# Patient Record
Sex: MALE | Race: BLACK OR AFRICAN AMERICAN | NOT HISPANIC OR LATINO
[De-identification: names, ages, dates, MRNs, and addresses within clinical notes are randomized per-mention and may not be internally consistent; named-entity substitution may affect disease eponyms.]

---

## 2017-01-23 ENCOUNTER — APPOINTMENT (OUTPATIENT)
Dept: OTOLARYNGOLOGY | Facility: CLINIC | Age: 57
End: 2017-01-23

## 2017-01-23 VITALS
SYSTOLIC BLOOD PRESSURE: 133 MMHG | DIASTOLIC BLOOD PRESSURE: 78 MMHG | BODY MASS INDEX: 28.79 KG/M2 | WEIGHT: 190 LBS | HEART RATE: 75 BPM | HEIGHT: 68 IN

## 2017-09-18 ENCOUNTER — OUTPATIENT (OUTPATIENT)
Dept: OUTPATIENT SERVICES | Facility: HOSPITAL | Age: 57
LOS: 1 days | End: 2017-09-18
Payer: COMMERCIAL

## 2017-09-18 VITALS
OXYGEN SATURATION: 100 % | HEIGHT: 70 IN | HEART RATE: 54 BPM | TEMPERATURE: 98 F | RESPIRATION RATE: 18 BRPM | SYSTOLIC BLOOD PRESSURE: 144 MMHG | WEIGHT: 195.99 LBS | DIASTOLIC BLOOD PRESSURE: 90 MMHG

## 2017-09-18 DIAGNOSIS — K11.20 SIALOADENITIS, UNSPECIFIED: ICD-10-CM

## 2017-09-18 DIAGNOSIS — K11.22 ACUTE RECURRENT SIALOADENITIS: ICD-10-CM

## 2017-09-18 DIAGNOSIS — Z01.818 ENCOUNTER FOR OTHER PREPROCEDURAL EXAMINATION: ICD-10-CM

## 2017-09-18 DIAGNOSIS — I63.9 CEREBRAL INFARCTION, UNSPECIFIED: ICD-10-CM

## 2017-09-18 DIAGNOSIS — E78.5 HYPERLIPIDEMIA, UNSPECIFIED: ICD-10-CM

## 2017-09-18 LAB
HCT VFR BLD CALC: 43.6 % — SIGNIFICANT CHANGE UP (ref 39–50)
HGB BLD-MCNC: 14.3 G/DL — SIGNIFICANT CHANGE UP (ref 13–17)
MCHC RBC-ENTMCNC: 23.6 PG — LOW (ref 27–34)
MCHC RBC-ENTMCNC: 32.8 GM/DL — SIGNIFICANT CHANGE UP (ref 32–36)
MCV RBC AUTO: 72.1 FL — LOW (ref 80–100)
PLATELET # BLD AUTO: 231 K/UL — SIGNIFICANT CHANGE UP (ref 150–400)
RBC # BLD: 6.05 M/UL — HIGH (ref 4.2–5.8)
RBC # FLD: 15.5 % — HIGH (ref 10.3–14.5)
WBC # BLD: 6.65 K/UL — SIGNIFICANT CHANGE UP (ref 3.8–10.5)
WBC # FLD AUTO: 6.65 K/UL — SIGNIFICANT CHANGE UP (ref 3.8–10.5)

## 2017-09-18 PROCEDURE — 80048 BASIC METABOLIC PNL TOTAL CA: CPT

## 2017-09-18 PROCEDURE — G0463: CPT

## 2017-09-18 PROCEDURE — 85027 COMPLETE CBC AUTOMATED: CPT

## 2017-09-18 RX ORDER — SODIUM CHLORIDE 9 MG/ML
3 INJECTION INTRAMUSCULAR; INTRAVENOUS; SUBCUTANEOUS EVERY 8 HOURS
Qty: 0 | Refills: 0 | Status: DISCONTINUED | OUTPATIENT
Start: 2017-09-22 | End: 2017-10-07

## 2017-09-18 RX ORDER — LIDOCAINE HCL 20 MG/ML
0.2 VIAL (ML) INJECTION ONCE
Qty: 0 | Refills: 0 | Status: DISCONTINUED | OUTPATIENT
Start: 2017-09-22 | End: 2017-10-07

## 2017-09-18 NOTE — H&P PST ADULT - PROBLEM SELECTOR PLAN 3
pt was on plavix for history of CVA, pt stopped plavix  9/14/2017,  per PCP and surgeon instructed,  instructed pt to see PCP for medical clearance before surgery

## 2017-09-18 NOTE — H&P PST ADULT - HISTORY OF PRESENT ILLNESS
This is a  58 y/o male c/o chronic sialoadenitis  x 7 years associated with left submandibular  swelling, he presents today for surgery

## 2017-09-18 NOTE — H&P PST ADULT - PROBLEM SELECTOR PLAN 1
interventional sialoendoscopy  bilateral submandibular glands  possible combined approach with stone removal  possible sialodocoplasty  possible kenalogue injection  ( pt was booked for bilateral mandibular glands surgery, pt stated ONLY LEFT submandibular surgery, left message for Ivonne surgical booking in surgeon office , instructed pt to call surgeon to verify side of surgery)

## 2017-09-18 NOTE — H&P PST ADULT - PMH
Heart murmur  echo  8/2017 no SBE  Hyperlipidemia    Stroke  2014   with speech impairment Heart murmur  echo  8/2017 no SBE  Hyperlipidemia    Stroke  2007  with speech impairment, on plavix

## 2017-09-19 LAB
ANION GAP SERPL CALC-SCNC: 14 MMOL/L — SIGNIFICANT CHANGE UP (ref 5–17)
BUN SERPL-MCNC: 21 MG/DL — SIGNIFICANT CHANGE UP (ref 7–23)
CALCIUM SERPL-MCNC: 9.6 MG/DL — SIGNIFICANT CHANGE UP (ref 8.4–10.5)
CHLORIDE SERPL-SCNC: 103 MMOL/L — SIGNIFICANT CHANGE UP (ref 96–108)
CO2 SERPL-SCNC: 24 MMOL/L — SIGNIFICANT CHANGE UP (ref 22–31)
CREAT SERPL-MCNC: 1.07 MG/DL — SIGNIFICANT CHANGE UP (ref 0.5–1.3)
GLUCOSE SERPL-MCNC: 76 MG/DL — SIGNIFICANT CHANGE UP (ref 70–99)
POTASSIUM SERPL-MCNC: 4.8 MMOL/L — SIGNIFICANT CHANGE UP (ref 3.5–5.3)
POTASSIUM SERPL-SCNC: 4.8 MMOL/L — SIGNIFICANT CHANGE UP (ref 3.5–5.3)
SODIUM SERPL-SCNC: 141 MMOL/L — SIGNIFICANT CHANGE UP (ref 135–145)

## 2017-09-22 ENCOUNTER — APPOINTMENT (OUTPATIENT)
Dept: OTOLARYNGOLOGY | Facility: HOSPITAL | Age: 57
End: 2017-09-22

## 2017-09-22 ENCOUNTER — OUTPATIENT (OUTPATIENT)
Dept: OUTPATIENT SERVICES | Facility: HOSPITAL | Age: 57
LOS: 1 days | End: 2017-09-22
Payer: COMMERCIAL

## 2017-09-22 ENCOUNTER — TRANSCRIPTION ENCOUNTER (OUTPATIENT)
Age: 57
End: 2017-09-22

## 2017-09-22 VITALS
HEIGHT: 70 IN | WEIGHT: 195.99 LBS | HEART RATE: 70 BPM | DIASTOLIC BLOOD PRESSURE: 90 MMHG | TEMPERATURE: 99 F | RESPIRATION RATE: 18 BRPM | OXYGEN SATURATION: 100 % | SYSTOLIC BLOOD PRESSURE: 144 MMHG

## 2017-09-22 VITALS
SYSTOLIC BLOOD PRESSURE: 122 MMHG | DIASTOLIC BLOOD PRESSURE: 75 MMHG | RESPIRATION RATE: 16 BRPM | HEART RATE: 68 BPM | OXYGEN SATURATION: 98 % | TEMPERATURE: 98 F

## 2017-09-22 DIAGNOSIS — K11.22 ACUTE RECURRENT SIALOADENITIS: ICD-10-CM

## 2017-09-22 DIAGNOSIS — K11.20 SIALOADENITIS, UNSPECIFIED: ICD-10-CM

## 2017-09-22 PROCEDURE — C1726: CPT

## 2017-09-22 PROCEDURE — 42699 UNLISTED PX SALIVRY GLND/DUX: CPT

## 2017-09-22 PROCEDURE — 42699B: CUSTOM

## 2017-09-22 RX ORDER — ONDANSETRON 8 MG/1
4 TABLET, FILM COATED ORAL ONCE
Qty: 0 | Refills: 0 | Status: DISCONTINUED | OUTPATIENT
Start: 2017-09-22 | End: 2017-10-07

## 2017-09-22 RX ORDER — SODIUM CHLORIDE 9 MG/ML
1000 INJECTION, SOLUTION INTRAVENOUS
Qty: 0 | Refills: 0 | Status: DISCONTINUED | OUTPATIENT
Start: 2017-09-22 | End: 2017-10-07

## 2017-09-22 RX ORDER — ACETAMINOPHEN 500 MG
1000 TABLET ORAL ONCE
Qty: 0 | Refills: 0 | Status: DISCONTINUED | OUTPATIENT
Start: 2017-09-22 | End: 2017-10-07

## 2017-09-22 RX ORDER — OXYCODONE HYDROCHLORIDE 5 MG/1
5 TABLET ORAL ONCE
Qty: 0 | Refills: 0 | Status: DISCONTINUED | OUTPATIENT
Start: 2017-09-22 | End: 2017-09-22

## 2017-09-22 RX ORDER — OXYCODONE HYDROCHLORIDE 5 MG/1
10 TABLET ORAL ONCE
Qty: 0 | Refills: 0 | Status: DISCONTINUED | OUTPATIENT
Start: 2017-09-22 | End: 2017-09-22

## 2017-09-22 RX ORDER — SODIUM CHLORIDE 9 MG/ML
1000 INJECTION INTRAMUSCULAR; INTRAVENOUS; SUBCUTANEOUS
Qty: 0 | Refills: 0 | Status: DISCONTINUED | OUTPATIENT
Start: 2017-09-22 | End: 2017-10-07

## 2017-09-22 NOTE — ASU PATIENT PROFILE, ADULT - PMH
Heart murmur  echo  8/2017 no SBE  Hyperlipidemia    Stroke  2007  with speech impairment, on plavix

## 2017-09-22 NOTE — ASU DISCHARGE PLAN (ADULT/PEDIATRIC). - NOTIFY
Fever greater than 101/Inability to Tolerate Liquids or Foods/Pain not relieved by Medications/Swelling that continues/Persistent Nausea and Vomiting/Unable to Urinate/Bleeding that does not stop

## 2017-09-22 NOTE — PRE-ANESTHESIA EVALUATION ADULT - NSANTHOSAYNRD_GEN_A_CORE
No. JOSE DAVID screening performed.  STOP BANG Legend: 0-2 = LOW Risk; 3-4 = INTERMEDIATE Risk; 5-8 = HIGH Risk
No. JOSE DAVID screening performed.  STOP BANG Legend: 0-2 = LOW Risk; 3-4 = INTERMEDIATE Risk; 5-8 = HIGH Risk

## 2017-09-22 NOTE — PRE-ANESTHESIA EVALUATION ADULT - NSANTHPMHFT_GEN_ALL_CORE
Slight left side of the body slightly weaker than the right, due to CVS 10 years ago, speech a little impaired

## 2017-09-22 NOTE — PRE-ANESTHESIA EVALUATION ADULT - MALLAMPATI CLASS
Class II - visualization of the soft palate, fauces, and uvula
Class II - visualization of the soft palate, fauces, and uvula

## 2017-09-22 NOTE — ASU DISCHARGE PLAN (ADULT/PEDIATRIC). - SPECIAL INSTRUCTIONS
keep saliva flowing to keep duct open and reduce risk of narrowing:  Massage gland every hour   ice for first 24 -48 hours, then can use warm compress  hydrate well  pain medication - Tylenol or Motrin as needed  expect swelling and discomfort  follow up 1 week in the office

## 2017-09-28 ENCOUNTER — APPOINTMENT (OUTPATIENT)
Dept: OTOLARYNGOLOGY | Facility: CLINIC | Age: 57
End: 2017-09-28
Payer: COMMERCIAL

## 2017-09-28 VITALS
HEART RATE: 62 BPM | HEIGHT: 70 IN | SYSTOLIC BLOOD PRESSURE: 123 MMHG | BODY MASS INDEX: 28.06 KG/M2 | WEIGHT: 196 LBS | DIASTOLIC BLOOD PRESSURE: 69 MMHG

## 2017-09-28 PROCEDURE — 99213 OFFICE O/P EST LOW 20 MIN: CPT

## 2019-06-11 PROBLEM — E78.5 HYPERLIPIDEMIA, UNSPECIFIED: Chronic | Status: ACTIVE | Noted: 2017-09-18

## 2019-06-11 PROBLEM — R01.1 CARDIAC MURMUR, UNSPECIFIED: Chronic | Status: ACTIVE | Noted: 2017-09-18

## 2019-07-02 ENCOUNTER — APPOINTMENT (OUTPATIENT)
Dept: OTOLARYNGOLOGY | Facility: CLINIC | Age: 59
End: 2019-07-02
Payer: COMMERCIAL

## 2019-07-02 VITALS
BODY MASS INDEX: 26.48 KG/M2 | HEART RATE: 58 BPM | DIASTOLIC BLOOD PRESSURE: 79 MMHG | WEIGHT: 185 LBS | HEIGHT: 70 IN | SYSTOLIC BLOOD PRESSURE: 125 MMHG

## 2019-07-02 PROCEDURE — 31575 DIAGNOSTIC LARYNGOSCOPY: CPT

## 2019-07-02 PROCEDURE — 99213 OFFICE O/P EST LOW 20 MIN: CPT | Mod: 25

## 2019-07-02 NOTE — CONSULT LETTER
[Dear  ___] : Dear  [unfilled], [Consult Closing:] : Thank you very much for allowing me to participate in the care of this patient.  If you have any questions, please do not hesitate to contact me. [Consult Letter:] : I had the pleasure of evaluating your patient, [unfilled]. [Please see my note below.] : Please see my note below. [Sincerely,] : Sincerely, [Michael Gaitan MD] : Michael Gaitan MD [Otolaryngology] : Otolaryngology [[facility]] : [facility] [[facility]] : [facility] [FreeTextEntry1] : Dear Dr. FABIENNE HUBER \par I had the pleasure of evaluating your patient RHYS CRUZ  thank you for allowing us to participate in their care. please see full note detailing our visit below.\par If you have any questions, please do not hesitate to call me and I would be happy to discuss further. \par \par Michael Gaitan M.D.\par Attending Physician,  \par Department of Otolaryngology - Head and Neck Surgery\par [facility] \par Office: [phone]\par Fax: [phone]\par

## 2019-07-02 NOTE — PHYSICAL EXAM
[Midline] : trachea located in midline position [Normal] : no masses and lesions seen, face is symmetric [de-identified] : Enlarged SMG b/l, good flow b/l

## 2019-07-02 NOTE — ASSESSMENT
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 no stone seen on sialendoscopy, however b/l severe stenosis dilated with good result, now with some swelling, pt concerned for mass - will get CT to deliniate anatomy, if no mass seen will do office dilation on next visit. no lesions on scope\par \par \par \par I personally saw and examined RHYS CRUZ in detail. I spoke to TARUN Mathew regarding the assessment and plan of care.  I preformed the procedures and I reviewed the above assessment and plan of care, and agree. I have made changes in changes in the body of the note where appropriate.\par \par \par

## 2019-07-02 NOTE — PROCEDURE
[de-identified] : Procedure performed: laryngeal Endoscopy- Diagnostic\par Pre-op/post op indication: dysphonia\par Verbal and/or written consent obtained from patient, Patient was unable to cooperate with mirror\par Scope #: 9, flexible fiber optic telescope used \par Scope was introduced through the nose passed on the floor of the nose to the nasopharynx and then followed down the soft palate to the lower pharynx. The tongue Base, Larynx, Hypopharynx were examined. Base of tongue was symmetric, vallecular was clear, epiglottis was not deformed, subglottis/ pyriform and posterior pharyngeal walls were clear. No erythema, edema, pooling of secretions, masses or lesions. Airway patent, no foreign body visualized. No glottic/supraglottic edema. VF clear arytenoids, vestibular folds, ventricles, pyriform sinuses, and aryepiglottic folds appear normal bilaterally. Vocal cords mobile with good contact b/l.\par \par

## 2019-07-02 NOTE — HISTORY OF PRESENT ILLNESS
[de-identified] : 57 y/o male s/p sialoendoscopy with bilateral dilations and wash outs of stenotic SMG's 9/22/17.\par He's been doing well since procedure but feels SMG have started to get neck swelling within the past several months- L>R.\par No issues with saliva production.\par No pain, dry mouth, foul taste in mouth or difficulty swallowing.

## 2019-07-10 ENCOUNTER — FORM ENCOUNTER (OUTPATIENT)
Age: 59
End: 2019-07-10

## 2019-07-11 ENCOUNTER — APPOINTMENT (OUTPATIENT)
Dept: ULTRASOUND IMAGING | Facility: CLINIC | Age: 59
End: 2019-07-11
Payer: COMMERCIAL

## 2019-07-11 ENCOUNTER — OUTPATIENT (OUTPATIENT)
Dept: OUTPATIENT SERVICES | Facility: HOSPITAL | Age: 59
LOS: 1 days | End: 2019-07-11
Payer: COMMERCIAL

## 2019-07-11 DIAGNOSIS — R22.1 LOCALIZED SWELLING, MASS AND LUMP, NECK: ICD-10-CM

## 2019-07-11 DIAGNOSIS — R22.0 LOCALIZED SWELLING, MASS AND LUMP, HEAD: ICD-10-CM

## 2019-07-11 PROCEDURE — 76536 US EXAM OF HEAD AND NECK: CPT

## 2019-07-11 PROCEDURE — 76536 US EXAM OF HEAD AND NECK: CPT | Mod: 26

## 2019-08-06 ENCOUNTER — APPOINTMENT (OUTPATIENT)
Dept: OTOLARYNGOLOGY | Facility: CLINIC | Age: 59
End: 2019-08-06
Payer: COMMERCIAL

## 2019-08-06 VITALS
SYSTOLIC BLOOD PRESSURE: 124 MMHG | HEART RATE: 60 BPM | DIASTOLIC BLOOD PRESSURE: 70 MMHG | WEIGHT: 185 LBS | BODY MASS INDEX: 26.48 KG/M2 | HEIGHT: 70 IN

## 2019-08-06 PROCEDURE — 42660 DILATION OF SALIVARY DUCT: CPT

## 2019-08-06 PROCEDURE — 99213 OFFICE O/P EST LOW 20 MIN: CPT | Mod: 25

## 2019-08-06 NOTE — ASSESSMENT
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 no stone seen on sialendoscopy, however b/l severe stenosis dilated with good result, now with some swelling, pt concerned for mass - no mass seen, cysts smaller, dilated today. at this point duct is as wide as can be reasonably expected. do not think that another sialoendoscopy would be beneficial. will see if resolves issue. if no pain, and just cosmetic deformity can consider removal for that but would significant affect salivation and risks of procedure, would prefer to monitor and not take risk of surgery \par \par \par \par I personally saw and examined RHYS CRUZ in detail. I spoke to TARUN Mathew regarding the assessment and plan of care.  I preformed the procedures and I reviewed the above assessment and plan of care, and agree. I have made changes in changes in the body of the note where appropriate.\par \par \par

## 2019-08-06 NOTE — PROCEDURE
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 they elected to proceed\par procedure - using microscopy and massage the punctum was identified.Storz punctum dilators dilated punctum 1-5 followed by conical dilator. some stenosis just proximal to the punctum dilated. Lacrimal probes introduced then introduced to dilate the full duct and sequential dilated form 0000 to size 7. did feel area of narrowing dilated. pt tolerated well. kenalog 10- NDC [id number] infused through the duct - 1cc, excess that came out of the duct disposed of\par \par \par procedure - salivary duct dilation right \par pre op dx - ductal stenosis and chronic sialoadenitis\par discussed risks, benefits and alternatives including bleeding, infection, further stenosis, perforation of the duct etc. they elected to proceed\par procedure - using microscopy and massage the punctum was identified.Storz punctum dilators dilated punctum 1-5 followed by conical dilator. Lacrimal probes introduced then introduced to dilate the full duct and sequential dilated form 0000 to size 6 - easily passed . did feel area of narrowing dilated. pt tolerated well. kenalog 10- NDC [id number] infused through the duct - 1cc, excess that came out of the duct disposed of\par  [de-identified] : \par \par

## 2019-08-06 NOTE — PHYSICAL EXAM
[Midline] : trachea located in midline position [Normal] : normal appearance, well groomed, well nourished, and in no acute distress [de-identified] : Enlarged SMG b/l, good flow b/l

## 2019-08-06 NOTE — HISTORY OF PRESENT ILLNESS
[de-identified] : 57 y/o male s/p sialoendoscopy with bilateral dilations and wash outs of stenotic SMG's 9/22/17.\par He's been doing well since procedure but feels SMG's have started to get neck swelling within the past several months- L>R. US neck 7/11/19 showed chronic dilation of left SMG duct with cystic region. Cystic region seen in CT in 2016- smaller now\par No issues with saliva production.\par No pain, dry mouth, foul taste in mouth or difficulty swallowing.

## 2021-08-25 ENCOUNTER — APPOINTMENT (OUTPATIENT)
Dept: UROLOGY | Facility: CLINIC | Age: 61
End: 2021-08-25
Payer: COMMERCIAL

## 2021-08-25 VITALS
DIASTOLIC BLOOD PRESSURE: 87 MMHG | HEART RATE: 70 BPM | BODY MASS INDEX: 26.48 KG/M2 | SYSTOLIC BLOOD PRESSURE: 128 MMHG | OXYGEN SATURATION: 100 % | WEIGHT: 185 LBS | HEIGHT: 70 IN | TEMPERATURE: 98.7 F | RESPIRATION RATE: 15 BRPM

## 2021-08-25 DIAGNOSIS — Z86.39 PERSONAL HISTORY OF OTHER ENDOCRINE, NUTRITIONAL AND METABOLIC DISEASE: ICD-10-CM

## 2021-08-25 DIAGNOSIS — Z80.42 FAMILY HISTORY OF MALIGNANT NEOPLASM OF PROSTATE: ICD-10-CM

## 2021-08-25 DIAGNOSIS — R33.9 RETENTION OF URINE, UNSPECIFIED: ICD-10-CM

## 2021-08-25 PROCEDURE — 99203 OFFICE O/P NEW LOW 30 MIN: CPT

## 2021-08-25 RX ORDER — OXYCODONE AND ACETAMINOPHEN 5; 325 MG/1; MG/1
5-325 TABLET ORAL
Qty: 35 | Refills: 0 | Status: DISCONTINUED | COMMUNITY
Start: 2017-09-22 | End: 2021-08-25

## 2021-08-25 RX ORDER — AMOXICILLIN AND CLAVULANATE POTASSIUM 875; 125 MG/1; MG/1
875-125 TABLET, COATED ORAL TWICE DAILY
Qty: 20 | Refills: 0 | Status: DISCONTINUED | COMMUNITY
Start: 2017-09-22 | End: 2021-08-25

## 2021-08-25 NOTE — ASSESSMENT
[FreeTextEntry1] : He tried Flomax for short time and did not see significant difference.  He does not seem to be bothered by urination however.  Residual urine volume in the office was about 200 cc.  He does not have a large prostate.  Possibility of urethral stricture was discussed.  Since he does not have significant bother right now, he will continue to monitor and follow-up in 6 months or so to reassess residual urine volume.  If incomplete bladder emptying persista, he should consider cystoscopy to rule out urethral stricture.\par \par Kevin Chakraborty MD, FACS\par [facility]\par  of Urology\par \par [address], [address]\[place] [address]\par \par [address], [address]\[place] [address]\par \par Tel: [phone]\par Fax: [phone]

## 2021-08-25 NOTE — REVIEW OF SYSTEMS
[Heartburn] : heartburn [Wake up at night to urinate  How many times?  ___] : wakes up to urinate [unfilled] times during the night [Strong urge to urinate] : strong urge to urinate [Itching] : itching [Swollen Glands] : swollen glands [Negative] : Endocrine

## 2021-08-25 NOTE — HISTORY OF PRESENT ILLNESS
[FreeTextEntry1] : He is a 61-year-old man who is seen today for initial visit.  Urinary flow is average or normal for him.  Nocturia is 2-4 times depending on how much he drinks before bedtime.  He was sent for ultrasound in July 2021 from [facility] which showed that the prostate measured about 28 g and the residual urine volume was 336 cc.  After he left the ultrasound suite he urinated again.  He was given Flomax which he only tried for 2 weeks and then he stopped because he did not see significant difference.  Residual urine today in the office was about 200 cc.  In December 2020, PSA level was 1.1 and urinalysis was normal.  Patient states that he is not bothered by his urination.

## 2021-08-25 NOTE — PHYSICAL EXAM
[General Appearance - Well Developed] : well developed [General Appearance - Well Nourished] : well nourished [Normal Appearance] : normal appearance [Well Groomed] : well groomed [General Appearance - In No Acute Distress] : no acute distress [Edema] : no peripheral edema [Respiration, Rhythm And Depth] : normal respiratory rhythm and effort [Exaggerated Use Of Accessory Muscles For Inspiration] : no accessory muscle use [Abdomen Soft] : soft [Abdomen Tenderness] : non-tender [Costovertebral Angle Tenderness] : no ~M costovertebral angle tenderness [Urethral Meatus] : meatus normal [Penis Abnormality] : normal uncircumcised penis [Urinary Bladder Findings] : the bladder was normal on palpation [Scrotum] : the scrotum was normal [Testes Tenderness] : no tenderness of the testes [Testes Mass (___cm)] : there were no testicular masses [Prostate Enlargement] : the prostate was not enlarged [Prostate Tenderness] : the prostate was not tender [No Prostate Nodules] : no prostate nodules [Normal Station and Gait] : the gait and station were normal for the patient's age [] : no rash [No Focal Deficits] : no focal deficits [Oriented To Time, Place, And Person] : oriented to person, place, and time [Affect] : the affect was normal [Mood] : the mood was normal [Not Anxious] : not anxious [Inguinal Lymph Nodes Enlarged Bilaterally] : inguinal

## 2021-08-25 NOTE — LETTER BODY
[Dear  ___] : Dear  [unfilled], [Consult Letter:] : I had the pleasure of evaluating your patient, [unfilled]. [Consult Closing:] : Thank you very much for allowing me to participate in the care of this patient.  If you have any questions, please do not hesitate to contact me. [FreeTextEntry1] : [facility]\par [address] \[place], [place], [address]  \par [phone]

## 2023-02-13 ENCOUNTER — NON-APPOINTMENT (OUTPATIENT)
Age: 63
End: 2023-02-13

## 2023-02-13 ENCOUNTER — APPOINTMENT (OUTPATIENT)
Dept: OTOLARYNGOLOGY | Facility: CLINIC | Age: 63
End: 2023-02-13
Payer: COMMERCIAL

## 2023-02-13 VITALS
WEIGHT: 185 LBS | HEART RATE: 67 BPM | HEIGHT: 71 IN | TEMPERATURE: 98 F | BODY MASS INDEX: 25.9 KG/M2 | SYSTOLIC BLOOD PRESSURE: 142 MMHG | DIASTOLIC BLOOD PRESSURE: 83 MMHG

## 2023-02-13 DIAGNOSIS — R07.0 PAIN IN THROAT: ICD-10-CM

## 2023-02-13 PROCEDURE — 99214 OFFICE O/P EST MOD 30 MIN: CPT | Mod: 25

## 2023-02-13 PROCEDURE — 31575 DIAGNOSTIC LARYNGOSCOPY: CPT

## 2023-02-13 NOTE — HISTORY OF PRESENT ILLNESS
[de-identified] : 59 y/o male s/p sialoendoscopy with bilateral dilations and wash outs of stenotic SMG's 9/22/17.\par He's been doing well since procedure but feels SMG's have started to get neck swelling within the past several months- L>R. US neck 7/11/19 showed chronic dilation of left SMG duct with cystic region. Cystic region seen in CT in 2016- smaller now\par No issues with saliva production.\par No pain, dry mouth, foul taste in mouth or difficulty swallowing. [FreeTextEntry1] : doing well for the last few years, feels left SMG is getting larger not painful\par started last year\par right side doing well \par still doing massage for feels throat clearing feels it helps \par no problems swallowing or speaking \par no dry mouth \par \par

## 2023-02-13 NOTE — REVIEW OF SYSTEMS
PRE-OP DIAGNOSIS:  Left renal stone 24-Aug-2020 22:23:41  Todd Caicedo  Gross hematuria 24-Aug-2020 22:23:18  Todd Caicedo [As Noted in HPI] : as noted in HPI [Negative] : Respiratory

## 2023-02-13 NOTE — CONSULT LETTER
[Dear  ___] : Dear  [unfilled], [Consult Letter:] : I had the pleasure of evaluating your patient, [unfilled]. [Please see my note below.] : Please see my note below. [Consult Closing:] : Thank you very much for allowing me to participate in the care of this patient.  If you have any questions, please do not hesitate to contact me. [Sincerely,] : Sincerely, [Michael Gaitan MD] : Michael Gaitan MD [Otolaryngology] : Otolaryngology [[facility]] : [facility] [[facility]] : [facility] [FreeTextEntry1] : Dear Dr. FABIENNE HUBER \par I had the pleasure of evaluating your patient RHYS CRUZ  thank you for allowing us to participate in their care. please see full note detailing our visit below.\par If you have any questions, please do not hesitate to call me and I would be happy to discuss further. \par \par Michael Gaitan M.D.\par Attending Physician,  \par Department of Otolaryngology - Head and Neck Surgery\par [facility] \par Office: [phone]\par Fax: [phone]\par

## 2023-02-13 NOTE — PROCEDURE
[de-identified] : \par \par Procedure performed: laryngeal Endoscopy- Diagnostic\par Pre-op/post op indication: dysphonia\par Verbal and/or written consent obtained from patient, Patient was unable to cooperate with mirror\par Scope #: 3, flexible fiber optic telescope used \par Scope was introduced through the nose passed on the floor of the nose to the nasopharynx and then followed down the soft palate to the lower pharynx. The tongue Base, Larynx, Hypopharynx were examined. Base of tongue was symmetric, vallecular was clear, epiglottis was not deformed, subglottis/ pyriform and posterior pharyngeal walls were clear. No erythema, edema, pooling of secretions, masses or lesions. Airway patent, no foreign body visualized. No glottic/supraglottic edema. True vocal cords, arytenoids, vestibular folds, ventricles, pyriform sinuses, and aryepiglottic folds appear normal bilaterally. Vocal cords mobile with good contact b/l.\par \par

## 2023-02-13 NOTE — ASSESSMENT
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 no stone seen on sialendoscopy, however b/l severe stenosis dilated with good result, now with some swelling for the past year \par will get Ct neck to rule out other pathology\par can consider office sialodochoplasty or other intervention if negative and would like to try \par \par Discussed options for recurrent submandibular sialoadenitis- observation with conservative management versus interventional sialoendoscopy vs excision of gland. \par Discussed details of the regiment/procedures and risks of each including but not limited to:\par office sialodochoplasty/ stone removal - bleeding, infection, scarring, inability to remove stone, ductal perforation/avulsion, injury to surrounding nerves, seroma, persistent sx need for further intervention\par interventional sialoendoscopy - higher yield and better precision with stone removal and dilation than preforming without direct visualization in the office however will be done in the operating room- bleeding, infection, scarring, inability to remove stone, ductal perforation/avulsion, injury to surrounding nerves, seroma, persistent sx \par Combined approach - increased risk of bleeding and infections injury to surrounding nerves including lingual, hypoglossal and sensory nerves as well as megaduct with large stone \par complete gland removal - external scar, injury to marginal mandibular nerve etc. \par \par

## 2023-02-13 NOTE — PHYSICAL EXAM
[Midline] : trachea located in midline position [Normal] : no masses and lesions seen, face is symmetric [de-identified] : left SMG fullness on visial exam and full on palpation, no mass or stone palpated, lots of murky fluid on firm massage  [de-identified] : Enlarged SMG b/l, good flow b/l

## 2023-02-20 ENCOUNTER — APPOINTMENT (OUTPATIENT)
Dept: CT IMAGING | Facility: CLINIC | Age: 63
End: 2023-02-20
Payer: COMMERCIAL

## 2023-02-20 ENCOUNTER — OUTPATIENT (OUTPATIENT)
Dept: OUTPATIENT SERVICES | Facility: HOSPITAL | Age: 63
LOS: 1 days | End: 2023-02-20
Payer: COMMERCIAL

## 2023-02-20 DIAGNOSIS — R22.0 LOCALIZED SWELLING, MASS AND LUMP, HEAD: ICD-10-CM

## 2023-02-20 PROCEDURE — 70491 CT SOFT TISSUE NECK W/DYE: CPT

## 2023-02-20 PROCEDURE — 70491 CT SOFT TISSUE NECK W/DYE: CPT | Mod: 26

## 2023-02-21 ENCOUNTER — NON-APPOINTMENT (OUTPATIENT)
Age: 63
End: 2023-02-21

## 2023-03-23 ENCOUNTER — APPOINTMENT (OUTPATIENT)
Dept: OTOLARYNGOLOGY | Facility: CLINIC | Age: 63
End: 2023-03-23
Payer: COMMERCIAL

## 2023-03-23 VITALS
TEMPERATURE: 98.2 F | BODY MASS INDEX: 25.9 KG/M2 | SYSTOLIC BLOOD PRESSURE: 131 MMHG | WEIGHT: 185 LBS | DIASTOLIC BLOOD PRESSURE: 83 MMHG | HEIGHT: 71 IN | HEART RATE: 62 BPM

## 2023-03-23 PROCEDURE — 42505 REPAIR SALIVARY DUCT: CPT

## 2023-03-23 RX ORDER — AMOXICILLIN AND CLAVULANATE POTASSIUM 875; 125 MG/1; MG/1
875-125 TABLET, COATED ORAL TWICE DAILY
Qty: 20 | Refills: 0 | Status: ACTIVE | COMMUNITY
Start: 2023-03-23 | End: 1900-01-01

## 2023-03-23 NOTE — PROCEDURE
[FreeTextEntry3] : Procedure - bilateral submandibular duct sialodochoplasty\par Pre/post op Dx - bilateral submandibular gland recurrent sialoadenitis with stenotic duct\par no complication \par EBL was minimal \par procedure - time out and site verified, consent obtained -  bleeding, infection, scarring, inability to remove stone, ductal perforation/avulsion, injury to surrounding nerves, seroma, persistent sx, she elected to proceed\par sialogogue administered \par floor of mouth tropicalized with 4% lidocaine on q-tips, after some time for anesthesia left warthon's duct punctum identified with binocular microscopy and dilated with punctal dilators form size 1-5 lacrimal probes  then,  punctum was severely stenotic and the duct was clearly dilated proximal to the stenosis  so floor of mouth injected with  Lidocaine with Epi NDC# 32771-133-60 followed by small papillotomy preformed in order to dilate. duct was canulated with angiocath and irrigated with lidocaine. it was subsequently dilated with lacrimal probes up to 7. angiocath introduced and duct was thoroughly irrigated. no stone was seen or palpated in the duct. A [place] sialostent 1mm was placed in the duct and sutured in place with 3-0 chromic good flow through the stent seen.\par \par  Right warthon's duct punctum identified with binocular microscopy and dilated with punctal dilators form size 1-5 and then lacrimal probes very slowly from 0000, punctum was stenotic so floor of mouth injected with  Lidocaine with Epi NDC# [id number] followed by small papillotomy preformed in order to dilate. duct was canulated with angiocath and irrigated with lidocaine. it was subsequently dilated with lacrimal probes up to 7. angiocath introduced and duct was thoroughly irrigated. no stone was seen or palpated in the duct. A [place] sialostent 1mm was placed in the duct and sutured in place with 3-0 chromic good flow through the stent seen.

## 2023-04-10 ENCOUNTER — APPOINTMENT (OUTPATIENT)
Dept: OTOLARYNGOLOGY | Facility: CLINIC | Age: 63
End: 2023-04-10
Payer: COMMERCIAL

## 2023-04-10 VITALS
BODY MASS INDEX: 25.9 KG/M2 | WEIGHT: 185 LBS | SYSTOLIC BLOOD PRESSURE: 129 MMHG | HEART RATE: 61 BPM | TEMPERATURE: 98.1 F | DIASTOLIC BLOOD PRESSURE: 76 MMHG | HEIGHT: 71 IN

## 2023-04-10 DIAGNOSIS — R22.0 LOCALIZED SWELLING, MASS AND LUMP, HEAD: ICD-10-CM

## 2023-04-10 DIAGNOSIS — R22.1 LOCALIZED SWELLING, MASS AND LUMP, HEAD: ICD-10-CM

## 2023-04-10 DIAGNOSIS — K11.23 CHRONIC SIALOADENITIS: ICD-10-CM

## 2023-04-10 PROCEDURE — 99024 POSTOP FOLLOW-UP VISIT: CPT

## 2023-04-10 NOTE — PHYSICAL EXAM
[Midline] : trachea located in midline position [Normal] : no masses and lesions seen, face is symmetric [de-identified] : left SMG fullness on visial exam and full on palpation, no mass or stone palpated, lots of murky fluid on firm massage  [de-identified] : Enlarged SMG b/l, good flow b/l

## 2023-04-10 NOTE — CONSULT LETTER
[Dear  ___] : Dear  [unfilled], [Consult Letter:] : I had the pleasure of evaluating your patient, [unfilled]. [Please see my note below.] : Please see my note below. [Consult Closing:] : Thank you very much for allowing me to participate in the care of this patient.  If you have any questions, please do not hesitate to contact me. [Sincerely,] : Sincerely, [Michael Gaitan MD] : Michael Gaitan MD [Otolaryngology] : Otolaryngology [[facility]] : [facility] [[facility]] : [facility] [FreeTextEntry1] : Dear Dr. FABIENNE HUBER \par I had the pleasure of evaluating your patient RHYS CRUZ  thank you for allowing us to participate in their care. please see full note detailing our visit below.\par If you have any questions, please do not hesitate to call me and I would be happy to discuss further. \par \par Michael Gaitan M.D.\par Attending Physician,  \par Department of Otolaryngology - Head and Neck Surgery\par [facility] \par Office: [phone]\par Fax: [phone]\par

## 2023-04-10 NOTE — END OF VISIT
[FreeTextEntry3] : I personally saw and examined the patient in detail. I spoke to TARUN Sahu regarding the assessment and plan of care.  I preformed the procedures and I reviewed the above assessment and plan of care, and agree. I have made changes in changes in the body of the note where appropriate.

## 2023-04-10 NOTE — DISCUSSION/SUMMARY
[FreeTextEntry1] : scan stable, Spoke to pt, would like to proceed with office sialodocoplasty\par \par will book

## 2023-04-10 NOTE — ASSESSMENT
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 no stone seen on sialendoscopy, however b/l severe stenosis dilated with good result, but had some swelling for the past year. \par Now s/p bilateral submandibular duct sialodochoplasty 3/23/23, pt reports  improvement on R side but still with L sided SMG discomfort. Stent came out of R SMG. Stent removed from L SMG\par - Will proceed with regiment to increase salivary flow to reduce pooling in the gland and washout any possible obstruction if possible. Recommended hydration, regular massage, sour candies, and warm compress\par - Pt will follow up if sx persist pt can consider in office dilation\par

## 2023-04-10 NOTE — HISTORY OF PRESENT ILLNESS
[de-identified] : 57 y/o male s/p sialoendoscopy with bilateral dilations and wash outs of stenotic SMG's 9/22/17.\par He's been doing well since procedure but feels SMG's have started to get neck swelling within the past several months- L>R. US neck 7/11/19 showed chronic dilation of left SMG duct with cystic region. Cystic region seen in CT in 2016- smaller now\par No issues with saliva production.\par No pain, dry mouth, foul taste in mouth or difficulty swallowing. [FreeTextEntry1] : Patient following up s/p bilateral submandibular duct sialodochoplasty 3/23/23. Still swollen especially on left side. Still doing massages.  Denies pain. no problems swallowing or speaking, no dry mouth.

## 2023-06-27 ENCOUNTER — EMERGENCY (EMERGENCY)
Facility: HOSPITAL | Age: 63
LOS: 0 days | Discharge: HOME | End: 2023-06-27
Attending: STUDENT IN AN ORGANIZED HEALTH CARE EDUCATION/TRAINING PROGRAM
Payer: COMMERCIAL

## 2023-06-27 VITALS
SYSTOLIC BLOOD PRESSURE: 138 MMHG | HEIGHT: 71 IN | TEMPERATURE: 98 F | OXYGEN SATURATION: 98 % | RESPIRATION RATE: 18 BRPM | DIASTOLIC BLOOD PRESSURE: 82 MMHG | HEART RATE: 62 BPM | WEIGHT: 184.97 LBS

## 2023-06-27 DIAGNOSIS — M79.642 PAIN IN LEFT HAND: ICD-10-CM

## 2023-06-27 DIAGNOSIS — Z86.73 PERSONAL HISTORY OF TRANSIENT ISCHEMIC ATTACK (TIA), AND CEREBRAL INFARCTION WITHOUT RESIDUAL DEFICITS: ICD-10-CM

## 2023-06-27 DIAGNOSIS — Z79.02 LONG TERM (CURRENT) USE OF ANTITHROMBOTICS/ANTIPLATELETS: ICD-10-CM

## 2023-06-27 DIAGNOSIS — M79.89 OTHER SPECIFIED SOFT TISSUE DISORDERS: ICD-10-CM

## 2023-06-27 DIAGNOSIS — L53.9 ERYTHEMATOUS CONDITION, UNSPECIFIED: ICD-10-CM

## 2023-06-27 DIAGNOSIS — L03.114 CELLULITIS OF LEFT UPPER LIMB: ICD-10-CM

## 2023-06-27 PROCEDURE — 99284 EMERGENCY DEPT VISIT MOD MDM: CPT

## 2023-06-27 RX ORDER — CEPHALEXIN 500 MG
500 CAPSULE ORAL ONCE
Refills: 0 | Status: COMPLETED | OUTPATIENT
Start: 2023-06-27 | End: 2023-06-27

## 2023-06-27 RX ORDER — CLOPIDOGREL BISULFATE 75 MG/1
1 TABLET, FILM COATED ORAL
Qty: 0 | Refills: 0 | DISCHARGE

## 2023-06-27 RX ORDER — SIMVASTATIN 20 MG/1
1 TABLET, FILM COATED ORAL
Qty: 0 | Refills: 0 | DISCHARGE

## 2023-06-27 RX ORDER — CEPHALEXIN 500 MG
1 CAPSULE ORAL
Qty: 28 | Refills: 0
Start: 2023-06-27 | End: 2023-07-03

## 2023-06-27 RX ADMIN — Medication 500 MILLIGRAM(S): at 21:42

## 2023-06-27 NOTE — ED ADULT TRIAGE NOTE - CHIEF COMPLAINT QUOTE
Patient c/o pain and swelling to left hand x 4 days. Patient states he overextended left hand backwards when pulling down tree branch. [facility] CVA 2007. (Plavix)

## 2023-06-27 NOTE — ED PROVIDER NOTE - OBJECTIVE STATEMENT
62-year-old male with history of CVA on Plavix previously to the ED with complaints of left hand pain x3 days reports dorsal hand swelling and redness, reports hyperextending hand previously.  Denies any trauma to the hand.  Full range of motion of wrist denies any tactile fever chills denies any nausea vomiting or other complaints.

## 2023-06-27 NOTE — ED PROVIDER NOTE - PHYSICAL EXAMINATION
Pt received sitting in bedside chair, +cath.
VITAL SIGNS: I have reviewed nursing notes and confirm.  CONSTITUTIONAL: well-appearing, non-toxic, NAD  SKIN: Warm dry, normal skin turgor, left dorsal hand swelling and erythema   HEAD: NCAT  PSYCH: Cooperative, appropriate.

## 2023-06-27 NOTE — ED ADULT NURSE NOTE - CHIEF COMPLAINT QUOTE
Patient c/o pain and swelling to left hand x 4 days. Patient states he overextended left hand backwards when pulling down tree branch. [facility] CVA 2007. (Plavix)

## 2023-06-27 NOTE — ED PROVIDER NOTE - PATIENT PORTAL LINK FT
You can access the FollowMyHealth Patient Portal offered by [facility] by registering at the following website: [email or web address] By joining "[employer]’s FollowMyHealth portal, you will also be able to view your health information using other applications (apps) compatible with our system.

## 2023-06-27 NOTE — ED PROVIDER NOTE - CARE PROVIDER_API CALL
Sabiha Lisa  Plastic Surgery  [address] [place] [address], [address]  [place], [place] [id number]  Phone: [phone]  Fax: [phone]  Follow Up Time: 4-6 Days

## 2024-04-25 NOTE — ED ADULT NURSE NOTE - NS PRO PASSIVE SMOKE EXP
6/21/23: CPE  Escitalopram 20 mg & bupropion  mg sent, both #90, 4 refills.  7/9/24: upcoming CPE  Additional refill sent to get through until appointment.  Encounter closed   Unknown

## 2025-03-13 ENCOUNTER — APPOINTMENT (OUTPATIENT)
Dept: OTOLARYNGOLOGY | Facility: CLINIC | Age: 65
End: 2025-03-13
Payer: COMMERCIAL

## 2025-03-13 VITALS
BODY MASS INDEX: 25.8 KG/M2 | DIASTOLIC BLOOD PRESSURE: 72 MMHG | TEMPERATURE: 98 F | HEART RATE: 68 BPM | WEIGHT: 185 LBS | SYSTOLIC BLOOD PRESSURE: 118 MMHG

## 2025-03-13 DIAGNOSIS — K11.23 CHRONIC SIALOADENITIS: ICD-10-CM

## 2025-03-13 DIAGNOSIS — R07.0 PAIN IN THROAT: ICD-10-CM

## 2025-03-13 DIAGNOSIS — R22.1 LOCALIZED SWELLING, MASS AND LUMP, HEAD: ICD-10-CM

## 2025-03-13 DIAGNOSIS — R22.0 LOCALIZED SWELLING, MASS AND LUMP, HEAD: ICD-10-CM

## 2025-03-13 PROCEDURE — 99213 OFFICE O/P EST LOW 20 MIN: CPT

## 2025-03-19 ENCOUNTER — OUTPATIENT (OUTPATIENT)
Dept: OUTPATIENT SERVICES | Facility: HOSPITAL | Age: 65
LOS: 1 days | End: 2025-03-19
Payer: COMMERCIAL

## 2025-03-19 ENCOUNTER — APPOINTMENT (OUTPATIENT)
Dept: ULTRASOUND IMAGING | Facility: CLINIC | Age: 65
End: 2025-03-19

## 2025-03-19 DIAGNOSIS — Z00.8 ENCOUNTER FOR OTHER GENERAL EXAMINATION: ICD-10-CM

## 2025-03-19 PROCEDURE — 76536 US EXAM OF HEAD AND NECK: CPT | Mod: 26

## 2025-03-19 PROCEDURE — 76536 US EXAM OF HEAD AND NECK: CPT

## 2025-03-31 ENCOUNTER — NON-APPOINTMENT (OUTPATIENT)
Age: 65
End: 2025-03-31

## 2025-04-07 ENCOUNTER — APPOINTMENT (OUTPATIENT)
Dept: OTOLARYNGOLOGY | Facility: CLINIC | Age: 65
End: 2025-04-07
Payer: COMMERCIAL

## 2025-04-07 VITALS — HEART RATE: 64 BPM | SYSTOLIC BLOOD PRESSURE: 108 MMHG | TEMPERATURE: 98 F | DIASTOLIC BLOOD PRESSURE: 67 MMHG

## 2025-04-07 DIAGNOSIS — K11.23 CHRONIC SIALOADENITIS: ICD-10-CM

## 2025-04-07 PROCEDURE — 42505 REPAIR SALIVARY DUCT: CPT | Mod: LT

## 2025-04-07 RX ORDER — AMOXICILLIN AND CLAVULANATE POTASSIUM 875; 125 MG/1; MG/1
875-125 TABLET, COATED ORAL
Qty: 20 | Refills: 0 | Status: ACTIVE | COMMUNITY
Start: 2025-04-07 | End: 1900-01-01

## 2025-04-21 ENCOUNTER — APPOINTMENT (OUTPATIENT)
Dept: OTOLARYNGOLOGY | Facility: CLINIC | Age: 65
End: 2025-04-21
Payer: COMMERCIAL

## 2025-04-21 VITALS — HEART RATE: 57 BPM | DIASTOLIC BLOOD PRESSURE: 69 MMHG | SYSTOLIC BLOOD PRESSURE: 110 MMHG | TEMPERATURE: 98 F

## 2025-04-21 DIAGNOSIS — R22.1 LOCALIZED SWELLING, MASS AND LUMP, HEAD: ICD-10-CM

## 2025-04-21 DIAGNOSIS — R22.0 LOCALIZED SWELLING, MASS AND LUMP, HEAD: ICD-10-CM

## 2025-04-21 DIAGNOSIS — K11.23 CHRONIC SIALOADENITIS: ICD-10-CM

## 2025-04-21 PROCEDURE — 99024 POSTOP FOLLOW-UP VISIT: CPT
